# Patient Record
Sex: FEMALE | Race: WHITE | Employment: FULL TIME | URBAN - METROPOLITAN AREA
[De-identification: names, ages, dates, MRNs, and addresses within clinical notes are randomized per-mention and may not be internally consistent; named-entity substitution may affect disease eponyms.]

---

## 2022-02-11 ENCOUNTER — HOSPITAL ENCOUNTER (OUTPATIENT)
Dept: LAB | Age: 27
Discharge: HOME | End: 2022-02-11
Payer: COMMERCIAL

## 2022-02-11 LAB
SARS-COV-2: NOT DETECTED
SOURCE: NORMAL

## 2022-02-11 PROCEDURE — U0003 INFECTIOUS AGENT DETECTION BY NUCLEIC ACID (DNA OR RNA); SEVERE ACUTE RESPIRATORY SYNDROME CORONAVIRUS 2 (SARS-COV-2) (CORONAVIRUS DISEASE [COVID-19]), AMPLIFIED PROBE TECHNIQUE, MAKING USE OF HIGH THROUGHPUT TECHNOLOGIES AS DESCRIBED BY CMS-2020-01-R: HCPCS

## 2022-02-11 PROCEDURE — U0005 INFEC AGEN DETEC AMPLI PROBE: HCPCS

## 2022-02-18 ENCOUNTER — ANESTHESIA (OUTPATIENT)
Dept: LABOR AND DELIVERY | Age: 27
DRG: 539 | End: 2022-02-18
Payer: COMMERCIAL

## 2022-02-18 ENCOUNTER — ANESTHESIA EVENT (OUTPATIENT)
Dept: LABOR AND DELIVERY | Age: 27
DRG: 539 | End: 2022-02-18
Payer: COMMERCIAL

## 2022-02-18 ENCOUNTER — HOSPITAL ENCOUNTER (INPATIENT)
Age: 27
LOS: 2 days | Discharge: HOME | DRG: 539 | End: 2022-02-20
Attending: OBSTETRICS & GYNECOLOGY | Admitting: OBSTETRICS & GYNECOLOGY
Payer: COMMERCIAL

## 2022-02-18 VITALS — OXYGEN SATURATION: 98 % | SYSTOLIC BLOOD PRESSURE: 106 MMHG | DIASTOLIC BLOOD PRESSURE: 72 MMHG

## 2022-02-18 LAB
ABO/RH: NORMAL
AMPHETAMINES: NEGATIVE
ANTIBODY SCREEN: NEGATIVE
BACTERIA: NEGATIVE /HPF
BARBITURATE SCREEN URINE: NEGATIVE
BENZODIAZEPINE SCREEN, URINE: NEGATIVE
BILIRUBIN URINE: NEGATIVE MG/DL
BLOOD, URINE: NEGATIVE
CANNABINOID SCREEN URINE: ABNORMAL
CLARITY: CLEAR
COCAINE METABOLITE: NEGATIVE
COLOR: YELLOW
GLUCOSE, URINE: NEGATIVE MG/DL
HCT VFR BLD CALC: 29.6 % (ref 37–47)
HEMOGLOBIN: 9.6 GM/DL (ref 12.5–16)
KETONES, URINE: NEGATIVE MG/DL
LEUKOCYTE ESTERASE, URINE: NEGATIVE
MCH RBC QN AUTO: 28.9 PG (ref 27–31)
MCHC RBC AUTO-ENTMCNC: 32.4 % (ref 32–36)
MCV RBC AUTO: 89.2 FL (ref 78–100)
NITRITE URINE, QUANTITATIVE: NEGATIVE
OPIATES, URINE: NEGATIVE
OXYCODONE: NEGATIVE
PDW BLD-RTO: 14.5 % (ref 11.7–14.9)
PH, URINE: 7.5 (ref 5–8)
PHENCYCLIDINE, URINE: NEGATIVE
PLATELET # BLD: 136 K/CU MM (ref 140–440)
PMV BLD AUTO: 10.7 FL (ref 7.5–11.1)
PROTEIN UA: NEGATIVE MG/DL
RBC # BLD: 3.32 M/CU MM (ref 4.2–5.4)
RBC URINE: NORMAL /HPF (ref 0–6)
SPECIFIC GRAVITY UA: 1.01 (ref 1–1.03)
SQUAMOUS EPITHELIAL: 8 /HPF
UROBILINOGEN, URINE: 0.2 MG/DL (ref 0.2–1)
WBC # BLD: 7.3 K/CU MM (ref 4–10.5)
WBC UA: NORMAL /HPF (ref 0–5)

## 2022-02-18 PROCEDURE — 86901 BLOOD TYPING SEROLOGIC RH(D): CPT

## 2022-02-18 PROCEDURE — 3700000001 HC ADD 15 MINUTES (ANESTHESIA): Performed by: OBSTETRICS & GYNECOLOGY

## 2022-02-18 PROCEDURE — 6360000002 HC RX W HCPCS: Performed by: OBSTETRICS & GYNECOLOGY

## 2022-02-18 PROCEDURE — 6370000000 HC RX 637 (ALT 250 FOR IP): Performed by: OBSTETRICS & GYNECOLOGY

## 2022-02-18 PROCEDURE — 3609079900 HC CESAREAN SECTION: Performed by: OBSTETRICS & GYNECOLOGY

## 2022-02-18 PROCEDURE — 6360000002 HC RX W HCPCS: Performed by: NURSE ANESTHETIST, CERTIFIED REGISTERED

## 2022-02-18 PROCEDURE — 0UL70CZ OCCLUSION OF BILATERAL FALLOPIAN TUBES WITH EXTRALUMINAL DEVICE, OPEN APPROACH: ICD-10-PCS | Performed by: OBSTETRICS & GYNECOLOGY

## 2022-02-18 PROCEDURE — 81001 URINALYSIS AUTO W/SCOPE: CPT

## 2022-02-18 PROCEDURE — 2500000003 HC RX 250 WO HCPCS: Performed by: NURSE ANESTHETIST, CERTIFIED REGISTERED

## 2022-02-18 PROCEDURE — 7100000000 HC PACU RECOVERY - FIRST 15 MIN: Performed by: OBSTETRICS & GYNECOLOGY

## 2022-02-18 PROCEDURE — 7100000001 HC PACU RECOVERY - ADDTL 15 MIN: Performed by: OBSTETRICS & GYNECOLOGY

## 2022-02-18 PROCEDURE — 2500000003 HC RX 250 WO HCPCS: Performed by: OBSTETRICS & GYNECOLOGY

## 2022-02-18 PROCEDURE — 2580000003 HC RX 258: Performed by: OBSTETRICS & GYNECOLOGY

## 2022-02-18 PROCEDURE — 80307 DRUG TEST PRSMV CHEM ANLYZR: CPT

## 2022-02-18 PROCEDURE — 88302 TISSUE EXAM BY PATHOLOGIST: CPT

## 2022-02-18 PROCEDURE — 86850 RBC ANTIBODY SCREEN: CPT

## 2022-02-18 PROCEDURE — 1220000000 HC SEMI PRIVATE OB R&B

## 2022-02-18 PROCEDURE — 3700000000 HC ANESTHESIA ATTENDED CARE: Performed by: OBSTETRICS & GYNECOLOGY

## 2022-02-18 PROCEDURE — 85027 COMPLETE CBC AUTOMATED: CPT

## 2022-02-18 PROCEDURE — 86900 BLOOD TYPING SEROLOGIC ABO: CPT

## 2022-02-18 RX ORDER — NALBUPHINE HCL 10 MG/ML
5 AMPUL (ML) INJECTION EVERY 4 HOURS PRN
Status: CANCELLED | OUTPATIENT
Start: 2022-02-18

## 2022-02-18 RX ORDER — DOCUSATE SODIUM 100 MG/1
100 CAPSULE, LIQUID FILLED ORAL 2 TIMES DAILY
Status: DISCONTINUED | OUTPATIENT
Start: 2022-02-18 | End: 2022-02-20 | Stop reason: HOSPADM

## 2022-02-18 RX ORDER — TRISODIUM CITRATE DIHYDRATE AND CITRIC ACID MONOHYDRATE 500; 334 MG/5ML; MG/5ML
30 SOLUTION ORAL ONCE
Status: COMPLETED | OUTPATIENT
Start: 2022-02-18 | End: 2022-02-18

## 2022-02-18 RX ORDER — SIMETHICONE 80 MG
80 TABLET,CHEWABLE ORAL EVERY 6 HOURS PRN
Status: DISCONTINUED | OUTPATIENT
Start: 2022-02-18 | End: 2022-02-20 | Stop reason: HOSPADM

## 2022-02-18 RX ORDER — ONDANSETRON 2 MG/ML
4 INJECTION INTRAMUSCULAR; INTRAVENOUS EVERY 6 HOURS PRN
Status: DISCONTINUED | OUTPATIENT
Start: 2022-02-18 | End: 2022-02-20 | Stop reason: HOSPADM

## 2022-02-18 RX ORDER — NALBUPHINE HCL 10 MG/ML
10 AMPUL (ML) INJECTION EVERY 4 HOURS PRN
Status: CANCELLED | OUTPATIENT
Start: 2022-02-18

## 2022-02-18 RX ORDER — SODIUM CHLORIDE 0.9 % (FLUSH) 0.9 %
10 SYRINGE (ML) INJECTION EVERY 12 HOURS SCHEDULED
Status: DISCONTINUED | OUTPATIENT
Start: 2022-02-18 | End: 2022-02-20 | Stop reason: HOSPADM

## 2022-02-18 RX ORDER — LIDOCAINE HYDROCHLORIDE 10 MG/ML
INJECTION, SOLUTION EPIDURAL; INFILTRATION; INTRACAUDAL; PERINEURAL PRN
Status: DISCONTINUED | OUTPATIENT
Start: 2022-02-18 | End: 2022-02-18 | Stop reason: SDUPTHER

## 2022-02-18 RX ORDER — ACETAMINOPHEN 325 MG/1
650 TABLET ORAL EVERY 4 HOURS PRN
Status: DISCONTINUED | OUTPATIENT
Start: 2022-02-18 | End: 2022-02-20 | Stop reason: HOSPADM

## 2022-02-18 RX ORDER — KETOROLAC TROMETHAMINE 30 MG/ML
30 INJECTION, SOLUTION INTRAMUSCULAR; INTRAVENOUS EVERY 6 HOURS PRN
Status: CANCELLED | OUTPATIENT
Start: 2022-02-18 | End: 2022-02-23

## 2022-02-18 RX ORDER — DIPHENHYDRAMINE HYDROCHLORIDE 50 MG/ML
25 INJECTION INTRAMUSCULAR; INTRAVENOUS EVERY 4 HOURS PRN
Status: DISCONTINUED | OUTPATIENT
Start: 2022-02-18 | End: 2022-02-20 | Stop reason: HOSPADM

## 2022-02-18 RX ORDER — LANOLIN 100 %
OINTMENT (GRAM) TOPICAL
Status: DISCONTINUED | OUTPATIENT
Start: 2022-02-18 | End: 2022-02-20 | Stop reason: HOSPADM

## 2022-02-18 RX ORDER — DIPHENHYDRAMINE HYDROCHLORIDE 50 MG/ML
25 INJECTION INTRAMUSCULAR; INTRAVENOUS EVERY 4 HOURS PRN
Status: CANCELLED | OUTPATIENT
Start: 2022-02-18

## 2022-02-18 RX ORDER — BUPIVACAINE HYDROCHLORIDE 7.5 MG/ML
INJECTION, SOLUTION INTRASPINAL PRN
Status: DISCONTINUED | OUTPATIENT
Start: 2022-02-18 | End: 2022-02-18 | Stop reason: SDUPTHER

## 2022-02-18 RX ORDER — DIPHENHYDRAMINE HYDROCHLORIDE 50 MG/ML
50 INJECTION INTRAMUSCULAR; INTRAVENOUS EVERY 4 HOURS PRN
Status: CANCELLED | OUTPATIENT
Start: 2022-02-18

## 2022-02-18 RX ORDER — OXYCODONE HYDROCHLORIDE AND ACETAMINOPHEN 5; 325 MG/1; MG/1
2 TABLET ORAL EVERY 4 HOURS PRN
Status: DISCONTINUED | OUTPATIENT
Start: 2022-02-18 | End: 2022-02-20 | Stop reason: HOSPADM

## 2022-02-18 RX ORDER — FERROUS SULFATE 325(65) MG
325 TABLET ORAL 2 TIMES DAILY WITH MEALS
Status: DISCONTINUED | OUTPATIENT
Start: 2022-02-18 | End: 2022-02-20 | Stop reason: HOSPADM

## 2022-02-18 RX ORDER — NALOXONE HYDROCHLORIDE 0.4 MG/ML
0.2 INJECTION, SOLUTION INTRAMUSCULAR; INTRAVENOUS; SUBCUTANEOUS PRN
Status: DISCONTINUED | OUTPATIENT
Start: 2022-02-18 | End: 2022-02-20 | Stop reason: HOSPADM

## 2022-02-18 RX ORDER — KETOROLAC TROMETHAMINE 30 MG/ML
INJECTION, SOLUTION INTRAMUSCULAR; INTRAVENOUS PRN
Status: DISCONTINUED | OUTPATIENT
Start: 2022-02-18 | End: 2022-02-18 | Stop reason: SDUPTHER

## 2022-02-18 RX ORDER — IBUPROFEN 800 MG/1
800 TABLET ORAL EVERY 8 HOURS SCHEDULED
Status: DISCONTINUED | OUTPATIENT
Start: 2022-02-18 | End: 2022-02-20 | Stop reason: HOSPADM

## 2022-02-18 RX ORDER — METHYLERGONOVINE MALEATE 0.2 MG/ML
200 INJECTION INTRAVENOUS PRN
Status: DISCONTINUED | OUTPATIENT
Start: 2022-02-18 | End: 2022-02-20 | Stop reason: HOSPADM

## 2022-02-18 RX ORDER — ONDANSETRON 4 MG/1
8 TABLET, ORALLY DISINTEGRATING ORAL EVERY 8 HOURS PRN
Status: DISCONTINUED | OUTPATIENT
Start: 2022-02-18 | End: 2022-02-20 | Stop reason: HOSPADM

## 2022-02-18 RX ORDER — KETOROLAC TROMETHAMINE 30 MG/ML
30 INJECTION, SOLUTION INTRAMUSCULAR; INTRAVENOUS EVERY 6 HOURS PRN
Status: DISCONTINUED | OUTPATIENT
Start: 2022-02-18 | End: 2022-02-20 | Stop reason: HOSPADM

## 2022-02-18 RX ORDER — NALBUPHINE HCL 10 MG/ML
5 AMPUL (ML) INJECTION EVERY 4 HOURS PRN
Status: DISCONTINUED | OUTPATIENT
Start: 2022-02-18 | End: 2022-02-20 | Stop reason: HOSPADM

## 2022-02-18 RX ORDER — BISACODYL 10 MG
10 SUPPOSITORY, RECTAL RECTAL DAILY PRN
Status: DISCONTINUED | OUTPATIENT
Start: 2022-02-18 | End: 2022-02-20 | Stop reason: HOSPADM

## 2022-02-18 RX ORDER — OXYCODONE HYDROCHLORIDE AND ACETAMINOPHEN 5; 325 MG/1; MG/1
1 TABLET ORAL EVERY 4 HOURS PRN
Status: DISCONTINUED | OUTPATIENT
Start: 2022-02-18 | End: 2022-02-20 | Stop reason: HOSPADM

## 2022-02-18 RX ORDER — NALOXONE HYDROCHLORIDE 0.4 MG/ML
0.4 INJECTION, SOLUTION INTRAMUSCULAR; INTRAVENOUS; SUBCUTANEOUS PRN
Status: DISCONTINUED | OUTPATIENT
Start: 2022-02-18 | End: 2022-02-20 | Stop reason: HOSPADM

## 2022-02-18 RX ORDER — SODIUM CHLORIDE, SODIUM LACTATE, POTASSIUM CHLORIDE, CALCIUM CHLORIDE 600; 310; 30; 20 MG/100ML; MG/100ML; MG/100ML; MG/100ML
INJECTION, SOLUTION INTRAVENOUS CONTINUOUS
Status: DISCONTINUED | OUTPATIENT
Start: 2022-02-18 | End: 2022-02-20 | Stop reason: HOSPADM

## 2022-02-18 RX ORDER — SODIUM CHLORIDE 9 MG/ML
25 INJECTION, SOLUTION INTRAVENOUS PRN
Status: DISCONTINUED | OUTPATIENT
Start: 2022-02-18 | End: 2022-02-20 | Stop reason: HOSPADM

## 2022-02-18 RX ORDER — ONDANSETRON 2 MG/ML
4 INJECTION INTRAMUSCULAR; INTRAVENOUS EVERY 6 HOURS PRN
Status: CANCELLED | OUTPATIENT
Start: 2022-02-18

## 2022-02-18 RX ORDER — ONDANSETRON 2 MG/ML
4 INJECTION INTRAMUSCULAR; INTRAVENOUS EVERY 6 HOURS PRN
Status: DISCONTINUED | OUTPATIENT
Start: 2022-02-18 | End: 2022-02-18 | Stop reason: HOSPADM

## 2022-02-18 RX ORDER — NALOXONE HYDROCHLORIDE 0.4 MG/ML
0.2 INJECTION, SOLUTION INTRAMUSCULAR; INTRAVENOUS; SUBCUTANEOUS PRN
Status: CANCELLED | OUTPATIENT
Start: 2022-02-18

## 2022-02-18 RX ORDER — SODIUM CHLORIDE, SODIUM LACTATE, POTASSIUM CHLORIDE, AND CALCIUM CHLORIDE .6; .31; .03; .02 G/100ML; G/100ML; G/100ML; G/100ML
1000 INJECTION, SOLUTION INTRAVENOUS ONCE
Status: COMPLETED | OUTPATIENT
Start: 2022-02-18 | End: 2022-02-18

## 2022-02-18 RX ORDER — DIPHENHYDRAMINE HYDROCHLORIDE 50 MG/ML
25 INJECTION INTRAMUSCULAR; INTRAVENOUS EVERY 6 HOURS PRN
Status: DISCONTINUED | OUTPATIENT
Start: 2022-02-18 | End: 2022-02-20 | Stop reason: HOSPADM

## 2022-02-18 RX ORDER — CLINDAMYCIN PHOSPHATE 900 MG/50ML
900 INJECTION INTRAVENOUS ONCE
Status: COMPLETED | OUTPATIENT
Start: 2022-02-18 | End: 2022-02-18

## 2022-02-18 RX ORDER — DIPHENHYDRAMINE HYDROCHLORIDE 50 MG/ML
50 INJECTION INTRAMUSCULAR; INTRAVENOUS EVERY 4 HOURS PRN
Status: DISCONTINUED | OUTPATIENT
Start: 2022-02-18 | End: 2022-02-20 | Stop reason: HOSPADM

## 2022-02-18 RX ORDER — SODIUM CHLORIDE 0.9 % (FLUSH) 0.9 %
10 SYRINGE (ML) INJECTION PRN
Status: DISCONTINUED | OUTPATIENT
Start: 2022-02-18 | End: 2022-02-20 | Stop reason: HOSPADM

## 2022-02-18 RX ORDER — NALOXONE HYDROCHLORIDE 0.4 MG/ML
0.4 INJECTION, SOLUTION INTRAMUSCULAR; INTRAVENOUS; SUBCUTANEOUS PRN
Status: CANCELLED | OUTPATIENT
Start: 2022-02-18

## 2022-02-18 RX ORDER — NALBUPHINE HCL 10 MG/ML
10 AMPUL (ML) INJECTION EVERY 4 HOURS PRN
Status: DISCONTINUED | OUTPATIENT
Start: 2022-02-18 | End: 2022-02-20 | Stop reason: HOSPADM

## 2022-02-18 RX ADMIN — ENOXAPARIN SODIUM 40 MG: 100 INJECTION SUBCUTANEOUS at 20:45

## 2022-02-18 RX ADMIN — GENTAMICIN SULFATE 351.2 MG: 40 INJECTION, SOLUTION INTRAMUSCULAR; INTRAVENOUS at 08:32

## 2022-02-18 RX ADMIN — ONDANSETRON 4 MG: 2 INJECTION INTRAMUSCULAR; INTRAVENOUS at 07:54

## 2022-02-18 RX ADMIN — KETOROLAC TROMETHAMINE 30 MG: 30 INJECTION, SOLUTION INTRAMUSCULAR at 08:59

## 2022-02-18 RX ADMIN — MORPHINE SULFATE 0.1 MG: 0.5 INJECTION, SOLUTION EPIDURAL; INTRATHECAL; INTRAVENOUS at 08:24

## 2022-02-18 RX ADMIN — BUPIVACAINE HYDROCHLORIDE IN DEXTROSE 1.6 ML: 7.5 INJECTION, SOLUTION SUBARACHNOID at 08:24

## 2022-02-18 RX ADMIN — SODIUM CHLORIDE, POTASSIUM CHLORIDE, SODIUM LACTATE AND CALCIUM CHLORIDE 1000 ML: 600; 310; 30; 20 INJECTION, SOLUTION INTRAVENOUS at 06:53

## 2022-02-18 RX ADMIN — LIDOCAINE HYDROCHLORIDE 3 ML: 10 INJECTION, SOLUTION EPIDURAL; INFILTRATION; INTRACAUDAL; PERINEURAL at 08:21

## 2022-02-18 RX ADMIN — SODIUM CHLORIDE, POTASSIUM CHLORIDE, SODIUM LACTATE AND CALCIUM CHLORIDE: 600; 310; 30; 20 INJECTION, SOLUTION INTRAVENOUS at 13:41

## 2022-02-18 RX ADMIN — CLINDAMYCIN PHOSPHATE 900 MG: 900 INJECTION, SOLUTION INTRAVENOUS at 07:57

## 2022-02-18 RX ADMIN — DOCUSATE SODIUM 100 MG: 100 CAPSULE, LIQUID FILLED ORAL at 20:45

## 2022-02-18 RX ADMIN — Medication 87.3 MILLI-UNITS/MIN: at 09:30

## 2022-02-18 RX ADMIN — KETOROLAC TROMETHAMINE 30 MG: 30 INJECTION, SOLUTION INTRAMUSCULAR; INTRAVENOUS at 15:03

## 2022-02-18 RX ADMIN — SODIUM CITRATE AND CITRIC ACID MONOHYDRATE 30 ML: 500; 334 SOLUTION ORAL at 07:54

## 2022-02-18 RX ADMIN — Medication 909 ML/HR: at 08:45

## 2022-02-18 RX ADMIN — SODIUM CHLORIDE, POTASSIUM CHLORIDE, SODIUM LACTATE AND CALCIUM CHLORIDE: 600; 310; 30; 20 INJECTION, SOLUTION INTRAVENOUS at 20:50

## 2022-02-18 RX ADMIN — SODIUM CHLORIDE, POTASSIUM CHLORIDE, SODIUM LACTATE AND CALCIUM CHLORIDE: 600; 310; 30; 20 INJECTION, SOLUTION INTRAVENOUS at 07:49

## 2022-02-18 ASSESSMENT — PULMONARY FUNCTION TESTS
PIF_VALUE: -15
PIF_VALUE: -16
PIF_VALUE: -16
PIF_VALUE: 0
PIF_VALUE: -13
PIF_VALUE: -16
PIF_VALUE: -14
PIF_VALUE: -14
PIF_VALUE: -16
PIF_VALUE: -14
PIF_VALUE: -16
PIF_VALUE: 0
PIF_VALUE: -16
PIF_VALUE: -15
PIF_VALUE: -16
PIF_VALUE: 0
PIF_VALUE: -16
PIF_VALUE: 0
PIF_VALUE: 0
PIF_VALUE: -16
PIF_VALUE: 0
PIF_VALUE: -16
PIF_VALUE: -14
PIF_VALUE: 0
PIF_VALUE: -15
PIF_VALUE: -16
PIF_VALUE: -15
PIF_VALUE: 0
PIF_VALUE: -16
PIF_VALUE: -15
PIF_VALUE: -14
PIF_VALUE: 0
PIF_VALUE: 0
PIF_VALUE: -16
PIF_VALUE: -1
PIF_VALUE: 0
PIF_VALUE: 0
PIF_VALUE: -14
PIF_VALUE: -16
PIF_VALUE: -16

## 2022-02-18 ASSESSMENT — PAIN SCALES - GENERAL: PAINLEVEL_OUTOF10: 3

## 2022-02-18 NOTE — FLOWSHEET NOTE
Pt stood at bedside and moves well. Denies feeling lightheaded or dizzy. Marilynn care and erwin care done by this nurse. Then pt resting back in bed.

## 2022-02-18 NOTE — PLAN OF CARE
Problem: Discharge Planning:  Goal: Discharged to appropriate level of care  Description: Discharged to appropriate level of care  2/18/2022 1326 by Lizbeth Campos RN  Outcome: Ongoing     Problem: Fluid Volume - Imbalance:  Goal: Absence of postpartum hemorrhage signs and symptoms  Description: Absence of postpartum hemorrhage signs and symptoms  2/18/2022 1326 by Lizbeth Campos RN  Outcome: Ongoing  Goal: Absence of imbalanced fluid volume signs and symptoms  Description: Absence of imbalanced fluid volume signs and symptoms  2/18/2022 1326 by Lizbeth Campos RN  Outcome: Ongoing     Problem: Infection - Surgical Site:  Goal: Will show no infection signs and symptoms  Description: Will show no infection signs and symptoms  2/18/2022 1326 by Lizbeth Campos RN  Outcome: Ongoing     Problem: Mood - Altered:  Goal: Mood stable  Description: Mood stable  2/18/2022 1326 by Lizbeth Campos RN  Outcome: Ongoing     Problem: Nausea/Vomiting:  Goal: Absence of nausea/vomiting  Description: Absence of nausea/vomiting  2/18/2022 1326 by Lizbeth Campos RN  Outcome: Ongoing     Problem: Pain - Acute:  Goal: Pain level will decrease  Description: Pain level will decrease  2/18/2022 1326 by Lizbeth Campos RN  Outcome: Ongoing     Problem: Urinary Retention:  Goal: Urinary elimination within specified parameters  Description: Urinary elimination within specified parameters  2/18/2022 1326 by Lizbeth Campos RN  Outcome: Ongoing     Problem: Venous Thromboembolism:  Goal: Absence of signs or symptoms of impaired coagulation  Description: Absence of signs or symptoms of impaired coagulation  2/18/2022 1326 by Lizbeth Campos RN  Outcome: Ongoing     Problem: Nutritional:  Goal: Knowledge of adequate nutritional intake and output  Description: Knowledge of adequate nutritional intake and output  2/18/2022 1326 by Lizbeth Campos RN  Outcome: Ongoing  Goal: Knowledge of infant formula  Description: Knowledge of infant formula  2/18/2022 1326 by Morgan Simeon. Rosa Marquez, SULY  Outcome: Ongoing  Goal: Knowledge of infant feeding cues  Description: Knowledge of infant feeding cues  2/18/2022 1326 by Morgan Simeon.  Rosa Marquez, SULY  Outcome: Ongoing

## 2022-02-18 NOTE — FLOWSHEET NOTE
Pt had sterilization done with a Filshie Clip. MRI Implant Notification ID Card given to pt. Explained card to pt that she needs to keep the card with her important papers and that she needs to show it to anyone who performs a MRI on her. Pt denies any questions. Pt verbalizes understanding.

## 2022-02-18 NOTE — FLOWSHEET NOTE
Patient presented this morning for scheduled R C/S and BPS. She arrived ambulatory with FOB. She denies bleeding or leaking of fluid, pain or tenderness,  fetal movement is obvious. Plan of care has been reviewed with understanding and agreement verbalized.

## 2022-02-18 NOTE — ANESTHESIA POSTPROCEDURE EVALUATION
Department of Anesthesiology  Postprocedure Note    Patient: Emmalene Leyden  MRN: [id number]  YOB: 1995  Date of evaluation: 2022  Time:  12:12 PM     Procedure Summary     Date: 22 Room / Location: [address] [place] [address]    Anesthesia Start: 4100 Anesthesia Stop: 3093    Procedures:        SECTION (N/A Abdomen)      TUBAL LIGATION (Bilateral ) Diagnosis: ( Repeat  section with BPS)    Surgeons: Eduard Pollack MD Responsible Provider:     Anesthesia Type: spinal ASA Status: 2          Anesthesia Type: spinal    Jelena Phase I: Jelena Score: 9    Jelena Phase II:      Last vitals: Reviewed and per EMR flowsheets.        Anesthesia Post Evaluation    Patient location during evaluation: floor  Patient participation: complete - patient participated  Level of consciousness: awake and alert  Pain score: 1  Airway patency: patent  Nausea & Vomiting: no nausea  Complications: no  Cardiovascular status: hemodynamically stable  Respiratory status: acceptable  Hydration status: euvolemic

## 2022-02-18 NOTE — PLAN OF CARE
Problem: Anxiety:  Goal: Level of anxiety will decrease  Description: Level of anxiety will decrease  2/18/2022 0649 by Yanelis Meneses RN  Outcome: Ongoing     Problem: Aspiration - Risk of:  Goal: Absence of aspiration  Description: Absence of aspiration  2/18/2022 7525 by Yanelis Meneses RN  Outcome: Ongoing     Problem: Tissue Perfusion - Uteroplacental, Altered:  Goal: Absence of abnormal fetal heart rate pattern  Description: Absence of abnormal fetal heart rate pattern  2/18/2022 0649 by Yanelis Meneses RN  Outcome: Ongoing     Problem: Venous Thromboembolism - Risk of:  Goal: Will show no signs or symptoms of venous thromboembolism  Description: Will show no signs or symptoms of venous thromboembolism  2/18/2022 0649 by Yanelis Meneses RN  Outcome: Ongoing     Problem: Discharge Planning:  Goal: Discharged to appropriate level of care  Description: Discharged to appropriate level of care  Outcome: Ongoing     Problem: Fluid Volume - Imbalance:  Goal: Absence of postpartum hemorrhage signs and symptoms  Description: Absence of postpartum hemorrhage signs and symptoms  Outcome: Ongoing  Goal: Absence of imbalanced fluid volume signs and symptoms  Description: Absence of imbalanced fluid volume signs and symptoms  Outcome: Ongoing     Problem: Infection - Surgical Site:  Goal: Will show no infection signs and symptoms  Description: Will show no infection signs and symptoms  Outcome: Ongoing     Problem: Mood - Altered:  Goal: Mood stable  Description: Mood stable  Outcome: Ongoing     Problem: Nausea/Vomiting:  Goal: Absence of nausea/vomiting  Description: Absence of nausea/vomiting  Outcome: Ongoing     Problem: Pain - Acute:  Goal: Pain level will decrease  Description: Pain level will decrease  Outcome: Ongoing     Problem: Urinary Retention:  Goal: Urinary elimination within specified parameters  Description: Urinary elimination within specified parameters  Outcome: Ongoing     Problem: Venous Thromboembolism:  Goal: Will show no signs or symptoms of venous thromboembolism  Description: Will show no signs or symptoms of venous thromboembolism  2/18/2022 0649 by Chelsie Spaulding RN  Outcome: Ongoing  Goal: Absence of signs or symptoms of impaired coagulation  Description: Absence of signs or symptoms of impaired coagulation  Outcome: Ongoing     Problem: Nutritional:  Goal: Knowledge of adequate nutritional intake and output  Description: Knowledge of adequate nutritional intake and output  Outcome: Ongoing  Goal: Knowledge of infant formula  Description: Knowledge of infant formula  Outcome: Ongoing  Goal: Knowledge of infant feeding cues  Description: Knowledge of infant feeding cues  Outcome: Ongoing

## 2022-02-18 NOTE — L&D DELIVERY NOTE
Mother's Information    Labor Events     labor?: No     Mother Delivery Information    Episiotomy: None  Lacerations: None  Surgical or Additional Est. Blood Loss (mL): 0 (View Only): Edit in Flowsheets   Combined Est. Blood Loss (mL): 0        Tomasz Butler, Baby Pending Nickolas Guzman [[id number]]    Labor Events     labor?: No   steroids?: None  Cervical ripening date/time:     Rupture date/time:             Assisted Delivery Details    Forceps attempted?: No  Vacuum extractor attempted?: No     Document Additional Attempt       Document Additional Attempt              Presentation    Presentation: Vertex  _: Occiput  _: Anterior     [place] Information    Head delivery date/time: 2022 08:44:00   Changing the 's delivery date/time could affect patient care.:    Delivery date/time:  22   Delivery type: , Low Transverse    Details:  Skin Incision Type: Pfannenstiel   Uterine Incision: Low Transverse         Delivery Providers    Delivering clinician:      Cord    Vessels: 3 Vessels     Placenta    Removal: Manual Removal  Appearance: Intact     Delivery Resuscitation    Method: Bulb Suction, Stimulation     Apgars    Living status: Living  Apgars   1 Minute:  5 Minute:  10 Minute 15 Minute 20 Minute   Skin Color: 1  1       Heart Rate: 2  2       Reflex Irritability: 2  2       Muscle Tone: 2  2       Respiratory Effort: 2  2       Total: 9  9                  [place] Measurements       Delivery Information    Episiotomy: None  Perineal lacerations: None    Surgical or additional est. blood loss (mL): 0 (View Only): Edit in Flowsheets   Combined est. blood loss (mL): 0     Other Procedures    Procedures: Bilateral Tubal Ligation, Filshie Clips, Other        PATIENT:    Cailin Fuchs    PREOPERATIVE DIAGNOSES:  1.  G3 39w2d        2. Prior uterine surgery, desires repeat       3.  Desires permanent sterilization     POSTOPERATIVE DIAGNOSES:  Same      PROCEDURE: Repeat low transverse  section. Bilateral tubal ligation with filschie clips and distal fimbriectomy      SURGEON:     Hernan Soria M.D.              ESTIMATED BLOOD LOSS:   500    FLUID REPLACEMENT  500    URINE OUTPUT   546      COMPLICATIONS:     None. ANESTHESIA:    Spinal.         FINDINGS:   Live male  Named Mark       Infant Apgars 9 and 9 at 1 and 5 min respectively. Weight: 7 lbs 11 oz. Normal tubes and ovaries bilaterally. DETAILS OF PROCEDURE:   The patient was taken to the operative room and anesthesia was given without any complications. She was then placed in the supine position slightly tilted to the left. Abdomen was prepped and draped in the usual manner. Anesthesia level was checked and was found to be adequate. The abdomen was entered and carried down sharply to the fascia. The fascia was entered and  from the underlying rectus abdominis muscles which were  in the midline exposing the parietal peritoneum. The peritoneum was opened sharply in a longitudinal fashion. A bladder retractor was placed in position and the visceral peritoneum overlying the lower uterine segment was opened transversely and a bladder flap was developed in a sharp manner. The lower uterine segment was opened in a low transverse fashion. The amniotic cavity was entered and  amniotic fluid was suctioned out. The baby was then delivered from the Cephalic position. Cord was clamped and cut and the baby was handed over to the nursery nurse. Cord blood was saved. The placenta was then removed manually and the endometrial cavity was swept clean by a wet lap. The edges of the uterine incision were grasped by Allis clamps and the incision itself was closed using a continuous locked suture of 0 vicryl. A second layer assured hemostasis.      Tubes and ovaries were inspected and appeared to be normal.  We once again confirmed with the patient the desire for permanent sterilization. A single filschie clip was placed across the distal portion of each tube; August Singhr Then a bilateral fimbriectomy was performed just past the filschies. Each fimbria was handed off to pathology    The gutters were cleared of any blood clots and debris. The operative field appeared to be hemostatic. Peritoneum was then closed with running stitch of 3.0 vicryl, fascia closed with 0 vicryl, subcutaneous tissues re-approximated with interrupted 3.0 vicryl, and skin closed with 3.0 monocryl, steri strips, and a dressing. The patient was then transferred to the recovery room in good stable condition. All sponge needle and instrument counts found to be correct. The patient was given antibiotics prior to the procedure for wound prophylaxis.     Electronically signed by Jose Alfredo Servin MD on 2/18/2022 at 9:16 AM

## 2022-02-18 NOTE — ANESTHESIA PROCEDURE NOTES
Spinal Block    Patient location during procedure: OB  Start time: 2/18/2022 8:19 AM  End time: 2/18/2022 8:25 AM  Reason for block: post-op pain management and primary anesthetic  Staffing  Performed: resident/CRNA   Resident/CRNA: LATIA Le CRNA  Preanesthetic Checklist  Completed: patient identified, IV checked, site marked, risks and benefits discussed, surgical consent, monitors and equipment checked, pre-op evaluation, timeout performed, anesthesia consent given, oxygen available and patient being monitored  Spinal Block  Patient position: sitting  Prep: ChloraPrep  Patient monitoring: cardiac monitor, continuous pulse ox and frequent blood pressure checks  Approach: midline  Location: L3/L4  Provider prep: mask and sterile gloves  Local infiltration: lidocaine  Dose: 0.2  Agent: bupivacaine  Adjuvant: duramorph  Dose: 1.6  Dose: 1.6  Needle  Needle type: Pencan   Needle gauge: 25 G  Needle length: 3.5 in  Needle insertion depth: 6 cm  Assessment  Sensory level: T4  Swirl obtained: Yes  CSF: clear  Attempts: 1  Hemodynamics: stable

## 2022-02-18 NOTE — FLOWSHEET NOTE
Pt from L&D RR to MB unit via bed. Pt condition stable. Informed pt if she needs nurse to call number listed on white board, pt verbalizes understanding. Lung sounds clear bilat. Pt instructed to C&DB every hour while awake. Instructed pt on how to splint abd while coughing. Pt does so at this time. Abd soft with abd dressing dry and intact. SCD on bilat below knees. Simpson patent with clear yellow urine. IV infusing without difficulty. IV site without redness or edema. IV Dressing is dry and intact. Pt oriented to room. Pt aware of how to use phone, tv and call light. Instructed pt on how to order meals. Pt aware of smoking and visitation policies. Pt instructed not to get out of bed without assistance, pt verbalizes understanding. Father of baby with pt. Fresh water pitcher given to pt. Assessment completed.

## 2022-02-18 NOTE — H&P
Department of Obstetrics and Gynecology   Obstetrics History and Physical        CHIEF COMPLAINT:  RCS BPS    HISTORY OF PRESENT ILLNESS:      The patient is a 32 y.o. female at 44w2d. OB History        3    Para        Term                AB        Living           SAB        IAB        Ectopic        Molar        Multiple        Live Births                Patient presents with a chief complaint as above and is being admitted for RCS with BPS    Estimated Due Date: Estimated Date of Delivery: 22    PRENATAL CARE:    Complicated by: none    PAST OB HISTORY  OB History        3    Para        Term                AB        Living           SAB        IAB        Ectopic        Molar        Multiple        Live Births                    Past Medical History:        Diagnosis Date    Anemia      Past Surgical History:        Procedure Laterality Date    ABDOMEN SURGERY       Allergies:  Amoxicillin, Ceclor [cefaclor], and Pcn [penicillins]  Social History:    Social History     Socioeconomic History    Marital status: Single     Spouse name: Not on file    Number of children: Not on file    Years of education: Not on file    Highest education level: Not on file   Occupational History    Not on file   Tobacco Use    Smoking status: Never Smoker    Smokeless tobacco: Never Used   Substance and Sexual Activity    Alcohol use: Not Currently    Drug use: Never    Sexual activity: Not on file   Other Topics Concern    Not on file   Social History Narrative    Not on file     Social Determinants of Health     Financial Resource Strain:     Difficulty of Paying Living Expenses: Not on file   Food Insecurity:     Worried About Running Out of Food in the Last Year: Not on file    Nj of Food in the Last Year: Not on file   Transportation Needs:     Lack of Transportation (Medical): Not on file    Lack of Transportation (Non-Medical):  Not on file   Physical Activity:     Days of Exercise per Week: Not on file    Minutes of Exercise per Session: Not on file   Stress:     Feeling of Stress : Not on file   Social Connections:     Frequency of Communication with Friends and Family: Not on file    Frequency of Social Gatherings with Friends and Family: Not on file    Attends Confucianism Services: Not on file    Active Member of [address] [place] [address] [employer] or Organizations: Not on file    Attends Club or Organization Meetings: Not on file    Marital Status: Not on file   Intimate Partner Violence:     Fear of Current or Ex-Partner: Not on file    Emotionally Abused: Not on file    Physically Abused: Not on file    Sexually Abused: Not on file   Housing Stability:     Unable to Pay for Housing in the Last Year: Not on file    Number of [place] in the Last Year: Not on file    Unstable Housing in the Last Year: Not on file     Family History:   History reviewed. No pertinent family history. Medications Prior to Admission:  Medications Prior to Admission: Prenatal Vit-Fe Fumarate-FA (PRENATAL 1+1 PO), Take by mouth    REVIEW OF SYSTEMS:    CONSTITUTIONAL:  negative  RESPIRATORY:  negative  CARDIOVASCULAR:  negative  GASTROINTESTINAL:  negative  ALLERGIC/IMMUNOLOGIC:  negative  NEUROLOGICAL:  negative  BEHAVIOR/PSYCH:  negative    PHYSICAL EXAM:  Blood pressure 108/68, pulse 93, temperature 98 °F (36.7 °C), resp. rate 19, height 5' 9\" (1.753 m), weight 168 lb (76.2 kg), SpO2 98 %. General appearance:  awake, alert, cooperative, no apparent distress, and appears stated age  Neurologic:  Awake, alert, oriented to name, place and time. Lungs:  No increased work of breathing, good air exchange  Abdomen:  Soft, non tender, gravid, consistent with her gestational age  Fetal heart rate:    Baseline Heart Rate:   Category 1 tracing     Pelvis:  Adequate pelvis  Cervix: Closee    Contraction frequency:  0 minutes    Membranes:  Intact    ASSESSMENT AND PLAN:    Labor: Admit, anticipate RCS with BPS. R/b/i/a discussed with patient.  Wishes to proceed  Fetus: Reassuring  GBS: No  Other:

## 2022-02-18 NOTE — ANESTHESIA PRE PROCEDURE
Procedure Laterality Date    ABDOMEN SURGERY         Social History:    Social History     Tobacco Use    Smoking status: Never Smoker    Smokeless tobacco: Never Used   Substance Use Topics    Alcohol use: Not Currently                                Counseling given: Not Answered      Vital Signs (Current):   Vitals:    02/18/22 0629 02/18/22 0641 02/18/22 0645   BP: 108/68     Pulse: 93 93    Resp: 18 19    Temp: 36.7 °C (98 °F) 36.7 °C (98 °F)    TempSrc: Oral     SpO2: 98%     Weight:   168 lb (76.2 kg)   Height:   5' 9\" (1.753 m)                                              BP Readings from Last 3 Encounters:   02/18/22 108/68       NPO Status:                                                                                 BMI:   Wt Readings from Last 3 Encounters:   02/18/22 168 lb (76.2 kg)     Body mass index is 24.81 kg/m². CBC:   Lab Results   Component Value Date    WBC 7.3 02/18/2022    RBC 3.32 02/18/2022    HGB 9.6 02/18/2022    HCT 29.6 02/18/2022    MCV 89.2 02/18/2022    RDW 14.5 02/18/2022     02/18/2022       CMP: No results found for: NA, K, CL, CO2, BUN, CREATININE, GFRAA, AGRATIO, LABGLOM, GLUCOSE, GLU, PROT, CALCIUM, BILITOT, ALKPHOS, AST, ALT    POC Tests: No results for input(s): POCGLU, POCNA, POCK, POCCL, POCBUN, POCHEMO, POCHCT in the last 72 hours.     Coags: No results found for: PROTIME, INR, APTT    HCG (If Applicable): No results found for: PREGTESTUR, PREGSERUM, HCG, HCGQUANT     ABGs: No results found for: PHART, PO2ART, UNX7JBV, QWM3DKD, BEART, U8TKPTSV     Type & Screen (If Applicable):  No results found for: LABABO, LABRH    Drug/Infectious Status (If Applicable):  No results found for: HIV, HEPCAB    COVID-19 Screening (If Applicable):   Lab Results   Component Value Date    COVID19 NOT DETECTED 02/11/2022           Anesthesia Evaluation  Patient summary reviewed and Nursing notes reviewed no history of anesthetic complications:   Airway: Mallampati: II  TM distance: >3 FB   Neck ROM: full  Mouth opening: > = 3 FB Dental:    (+) poor dentition      Pulmonary:Negative Pulmonary ROS                              Cardiovascular:Negative CV ROS  Exercise tolerance: good (>4 METS),                     Neuro/Psych:   Negative Neuro/Psych ROS              GI/Hepatic/Renal: Neg GI/Hepatic/Renal ROS            Endo/Other: Negative Endo/Other ROS                    Abdominal:             Vascular: negative vascular ROS. Other Findings:             Anesthesia Plan      spinal     ASA 2             Anesthetic plan and risks discussed with patient and spouse.                       Vannessa Bella, APRN - CRNA   2/18/2022

## 2022-02-19 LAB
BASOPHILS ABSOLUTE: 0 K/CU MM
BASOPHILS RELATIVE PERCENT: 0.2 % (ref 0–1)
DIFFERENTIAL TYPE: ABNORMAL
EOSINOPHILS ABSOLUTE: 0.1 K/CU MM
EOSINOPHILS RELATIVE PERCENT: 1.3 % (ref 0–3)
HCT VFR BLD CALC: 29.1 % (ref 37–47)
HEMOGLOBIN: 9.2 GM/DL (ref 12.5–16)
IMMATURE NEUTROPHIL %: 0.8 % (ref 0–0.43)
LYMPHOCYTES ABSOLUTE: 1.1 K/CU MM
LYMPHOCYTES RELATIVE PERCENT: 12.5 % (ref 24–44)
MCH RBC QN AUTO: 28.6 PG (ref 27–31)
MCHC RBC AUTO-ENTMCNC: 31.6 % (ref 32–36)
MCV RBC AUTO: 90.4 FL (ref 78–100)
MONOCYTES ABSOLUTE: 0.5 K/CU MM
MONOCYTES RELATIVE PERCENT: 5 % (ref 0–4)
NUCLEATED RBC %: 0 %
PDW BLD-RTO: 14.6 % (ref 11.7–14.9)
PLATELET # BLD: 117 K/CU MM (ref 140–440)
PMV BLD AUTO: 10.6 FL (ref 7.5–11.1)
RBC # BLD: 3.22 M/CU MM (ref 4.2–5.4)
SEGMENTED NEUTROPHILS ABSOLUTE COUNT: 7.2 K/CU MM
SEGMENTED NEUTROPHILS RELATIVE PERCENT: 80.2 % (ref 36–66)
TOTAL IMMATURE NEUTOROPHIL: 0.07 K/CU MM
TOTAL NUCLEATED RBC: 0 K/CU MM
WBC # BLD: 9 K/CU MM (ref 4–10.5)

## 2022-02-19 PROCEDURE — 6370000000 HC RX 637 (ALT 250 FOR IP): Performed by: OBSTETRICS & GYNECOLOGY

## 2022-02-19 PROCEDURE — 6360000002 HC RX W HCPCS: Performed by: OBSTETRICS & GYNECOLOGY

## 2022-02-19 PROCEDURE — 1220000000 HC SEMI PRIVATE OB R&B

## 2022-02-19 PROCEDURE — 85025 COMPLETE CBC W/AUTO DIFF WBC: CPT

## 2022-02-19 RX ORDER — OXYCODONE HYDROCHLORIDE AND ACETAMINOPHEN 5; 325 MG/1; MG/1
1 TABLET ORAL EVERY 6 HOURS PRN
Qty: 24 TABLET | Refills: 0 | Status: SHIPPED | OUTPATIENT
Start: 2022-02-19 | End: 2022-02-26

## 2022-02-19 RX ORDER — IBUPROFEN 800 MG/1
800 TABLET ORAL EVERY 8 HOURS PRN
Qty: 30 TABLET | Refills: 1 | Status: SHIPPED | OUTPATIENT
Start: 2022-02-19

## 2022-02-19 RX ADMIN — ENOXAPARIN SODIUM 40 MG: 100 INJECTION SUBCUTANEOUS at 14:11

## 2022-02-19 RX ADMIN — IBUPROFEN 800 MG: 800 TABLET, FILM COATED ORAL at 22:44

## 2022-02-19 RX ADMIN — OXYCODONE AND ACETAMINOPHEN 1 TABLET: 5; 325 TABLET ORAL at 05:03

## 2022-02-19 RX ADMIN — IBUPROFEN 800 MG: 800 TABLET, FILM COATED ORAL at 06:16

## 2022-02-19 RX ADMIN — DOCUSATE SODIUM 100 MG: 100 CAPSULE, LIQUID FILLED ORAL at 20:12

## 2022-02-19 RX ADMIN — IBUPROFEN 800 MG: 800 TABLET, FILM COATED ORAL at 14:11

## 2022-02-19 RX ADMIN — DOCUSATE SODIUM 100 MG: 100 CAPSULE, LIQUID FILLED ORAL at 09:06

## 2022-02-19 RX ADMIN — OXYCODONE AND ACETAMINOPHEN 1 TABLET: 5; 325 TABLET ORAL at 23:49

## 2022-02-19 RX ADMIN — OXYCODONE AND ACETAMINOPHEN 1 TABLET: 5; 325 TABLET ORAL at 09:06

## 2022-02-19 RX ADMIN — OXYCODONE AND ACETAMINOPHEN 1 TABLET: 5; 325 TABLET ORAL at 18:31

## 2022-02-19 ASSESSMENT — PAIN SCALES - GENERAL
PAINLEVEL_OUTOF10: 6
PAINLEVEL_OUTOF10: 5
PAINLEVEL_OUTOF10: 3
PAINLEVEL_OUTOF10: 4
PAINLEVEL_OUTOF10: 3
PAINLEVEL_OUTOF10: 5
PAINLEVEL_OUTOF10: 4
PAINLEVEL_OUTOF10: 2

## 2022-02-19 NOTE — PLAN OF CARE
Problem: Discharge Planning:  Goal: Discharged to appropriate level of care  Description: Discharged to appropriate level of care  Outcome: Ongoing     Problem: Fluid Volume - Imbalance:  Goal: Absence of postpartum hemorrhage signs and symptoms  Description: Absence of postpartum hemorrhage signs and symptoms  2/19/2022 1353 by Becca Armendariz. Joe Boateng RN  Outcome: Ongoing  2/19/2022 0836 by Barry Black RN  Outcome: Met This Shift  Goal: Absence of imbalanced fluid volume signs and symptoms  Description: Absence of imbalanced fluid volume signs and symptoms  2/19/2022 1353 by Becca Armendariz. Joe Boateng RN  Outcome: Ongoing  2/19/2022 0836 by Barry Black RN  Outcome: Met This Shift     Problem: Infection - Surgical Site:  Goal: Will show no infection signs and symptoms  Description: Will show no infection signs and symptoms  2/19/2022 1353 by Becca Armendariz. Joe Boateng RN  Outcome: Ongoing  2/19/2022 0836 by Barry Black RN  Outcome: Met This Shift     Problem: Nausea/Vomiting:  Goal: Absence of nausea/vomiting  Description: Absence of nausea/vomiting  2/19/2022 1353 by Becca Armendariz. Joe Boateng RN  Outcome: Ongoing  2/19/2022 0836 by Barry Black RN  Outcome: Met This Shift     Problem: Pain - Acute:  Goal: Pain level will decrease  Description: Pain level will decrease  2/19/2022 1353 by Becca Armendariz. Joe Boateng RN  Outcome: Ongoing  2/19/2022 0836 by Barry Black RN  Outcome: Met This Shift     Problem: Urinary Retention:  Goal: Urinary elimination within specified parameters  Description: Urinary elimination within specified parameters  2/19/2022 1353 by Becca Armendariz. Joe Boateng RN  Outcome: Ongoing  2/19/2022 0836 by Barry Black RN  Outcome: Met This Shift     Problem: Venous Thromboembolism:  Goal: Absence of signs or symptoms of impaired coagulation  Description: Absence of signs or symptoms of impaired coagulation  2/19/2022 1353 by Becca Armendraiz.  Joe Boateng RN  Outcome: Ongoing  2/19/2022 0836 by Barry Black RN  Outcome: Met This Shift     Problem: Pain:  Goal: Pain level will decrease  Description: Pain level will decrease  2/19/2022 1353 by Shakila Toney. Frank Santana RN  Outcome: Ongoing  2/19/2022 0836 by Lucrecia Tolliver RN  Outcome: Met This Shift  Goal: Control of acute pain  Description: Control of acute pain  2/19/2022 1353 by Shakila Toney. rFank Santana RN  Outcome: Ongoing  2/19/2022 0836 by Lucrecia Tolliver RN  Outcome: Met This Shift  Goal: Control of chronic pain  Description: Control of chronic pain  2/19/2022 1353 by Shakila Toney. Frank Santana RN  Outcome: Ongoing  2/19/2022 0836 by Lucrecia Tolliver RN  Outcome: Met This Shift     Problem: Mood - Altered:  Goal: Mood stable  Description: Mood stable  2/19/2022 1353 by Shakila Toney. Frank Santana RN  Outcome: Completed  2/19/2022 0836 by Lucrecia Tolliver RN  Outcome: Met This Shift     Problem: Nutritional:  Goal: Knowledge of adequate nutritional intake and output  Description: Knowledge of adequate nutritional intake and output  2/19/2022 1353 by Shakila Toney. Frank Santana RN  Outcome: Completed  2/19/2022 0836 by Lucrecia Tolliver RN  Outcome: Met This Shift  Goal: Knowledge of infant formula  Description: Knowledge of infant formula  2/19/2022 1353 by Shakila Toney. Frank Santana RN  Outcome: Completed  2/19/2022 0836 by Lucrecia Tolliver RN  Outcome: Met This Shift  Goal: Knowledge of infant feeding cues  Description: Knowledge of infant feeding cues  2/19/2022 1353 by Shakila Toney.  Frank Santana RN  Outcome: Completed  2/19/2022 0836 by Lucrecia Tolliver RN  Outcome: Met This Shift

## 2022-02-19 NOTE — CARE COORDINATION
LSW received consult re: mother and baby (+) mj. LSW reviewed chart and met with pt/FOB Fredis Nolen) to initiate dc planning. LSW advised pt of sensitive PHI needing to be discussed. Pt provided verbal permission to discuss PHI with FOB present. Pt delivered baby boyAllyssa 02/18/22. Pt reported prior to admission living with FOB at [place] [address], [facility], [address] [facility], [address], #[phone]. Pt and FOB reported recently moving to [facility] because of available housing. FOB reported he is from [facility], however, d/t the location now being a tourist attraction, he could not find affordable housing and moved to [facility] as rec'd by a friend. Pt reported having two other children, Carolina 10 & Geronimo 6, who reside in [place] [address], where pt is from. Pt reported her dtr resides with her sister and her son resides with his father. Pt reported having custody of both children and admits to previous CPS involvement w/o active case. Pt reported her dtr chose to stay in [place] because of the school system, however, did not offer an explanation for her son. Pt reported moving from [place] to [facility] last yr after meeting FOB. Pt reported working FT at [employer] prior to delivery with intentions to return. Pt reported FOB also works FT at [employer]. Pt has material items for baby, plans to formula feed and has a working vehicle. Pt has health insurance through [address]. Pt has not yet made contact with [facility]. LSW encouraged pt contact [facility] after holiday to schedule mtg for [address] assistance. Pt is unsure of pedi care and does not have PCP herself. LSW encouraged [facility] referral, although pt is unsure. LSW dicussed uds (+) mj. Pt admitted to smoking mj throughout pregnancy to help give her an appetite. Pt denied drug addiction is is not interested in counseling services at this time. LSW advised mj is illegal in [facility] [place] and encouraged abstinence. LSW advised a CPS referral would be made.   Pt voiced verbal understanding and denied questions. LSW gave referral to Coni Mullins on call CPS , who accepted referral and will f/u with pt prior to dc. CM staff to follow.   Electronically signed by SAMMY Muñoz on 2/19/2022 at 11:34 AM

## 2022-02-19 NOTE — PLAN OF CARE
Problem: Fluid Volume - Imbalance:  Goal: Absence of postpartum hemorrhage signs and symptoms  Description: Absence of postpartum hemorrhage signs and symptoms  Outcome: Met This Shift  Goal: Absence of imbalanced fluid volume signs and symptoms  Description: Absence of imbalanced fluid volume signs and symptoms  Outcome: Met This Shift     Problem: Infection - Surgical Site:  Goal: Will show no infection signs and symptoms  Description: Will show no infection signs and symptoms  Outcome: Met This Shift     Problem: Mood - Altered:  Goal: Mood stable  Description: Mood stable  Outcome: Met This Shift     Problem: Nausea/Vomiting:  Goal: Absence of nausea/vomiting  Description: Absence of nausea/vomiting  Outcome: Met This Shift     Problem: Pain - Acute:  Goal: Pain level will decrease  Description: Pain level will decrease  Outcome: Met This Shift     Problem: Urinary Retention:  Goal: Urinary elimination within specified parameters  Description: Urinary elimination within specified parameters  Outcome: Met This Shift     Problem: Venous Thromboembolism:  Goal: Absence of signs or symptoms of impaired coagulation  Description: Absence of signs or symptoms of impaired coagulation  Outcome: Met This Shift     Problem: Nutritional:  Goal: Knowledge of adequate nutritional intake and output  Description: Knowledge of adequate nutritional intake and output  Outcome: Met This Shift  Goal: Knowledge of infant formula  Description: Knowledge of infant formula  Outcome: Met This Shift  Goal: Knowledge of infant feeding cues  Description: Knowledge of infant feeding cues  Outcome: Met This Shift     Problem: Pain:  Goal: Pain level will decrease  Description: Pain level will decrease  Outcome: Met This Shift  Goal: Control of acute pain  Description: Control of acute pain  Outcome: Met This Shift  Goal: Control of chronic pain  Description: Control of chronic pain  Outcome: Met This Shift

## 2022-02-19 NOTE — PROGRESS NOTES
Subjective:     Postpartum Day 1:  Delivery    The patient feels well. The patient denies emotional concerns. Pain is controlled with current medications. The baby is well. Urinary output is adequate. The patient is ambulating well and tolerating a normal diet. Flatus has been passed. Objective: Intake/Output Summary (Last 24 hours) at 2022 3051  Last data filed at 2022  Gross per 24 hour   Intake 2540.14 ml   Output 1950 ml   Net 590.14 ml        Vitals:    22 0822   BP: (!) 99/59   Pulse: 60   Resp: 16   Temp: 97.4 °F (36.3 °C)   SpO2: 96%       General:    Alert and oriented, appears stated age, cooperative       Lochia:  appropriate   Uterine Fundus:   Fundus firm and nontender   Incision:  Healing well with no significant drainage; no drainage or dehiscence   DVT Evaluation:  No evidence of DVT; calves nontender     Lab Results   Component Value Date    WBC 7.3 2022    HGB 9.6 (L) 2022    HCT 29.6 (L) 2022    MCV 89.2 2022     (L) 2022       Assessment:     Status post  section. Doing well postoperatively. Postoperative course complicated by anemia     Plan:     Continue current care.     Electronically signed by Kee Hooper MD on 2022 at 8:22 AM

## 2022-02-20 VITALS
OXYGEN SATURATION: 98 % | BODY MASS INDEX: 24.88 KG/M2 | HEIGHT: 69 IN | HEART RATE: 64 BPM | TEMPERATURE: 97.2 F | RESPIRATION RATE: 16 BRPM | DIASTOLIC BLOOD PRESSURE: 73 MMHG | SYSTOLIC BLOOD PRESSURE: 114 MMHG | WEIGHT: 168 LBS

## 2022-02-20 PROCEDURE — 6370000000 HC RX 637 (ALT 250 FOR IP): Performed by: OBSTETRICS & GYNECOLOGY

## 2022-02-20 PROCEDURE — 90471 IMMUNIZATION ADMIN: CPT | Performed by: OBSTETRICS & GYNECOLOGY

## 2022-02-20 PROCEDURE — 6360000002 HC RX W HCPCS: Performed by: OBSTETRICS & GYNECOLOGY

## 2022-02-20 PROCEDURE — 90707 MMR VACCINE SC: CPT | Performed by: OBSTETRICS & GYNECOLOGY

## 2022-02-20 RX ORDER — FERROUS SULFATE 325(65) MG
325 TABLET ORAL
Qty: 30 TABLET | Refills: 1 | Status: SHIPPED | OUTPATIENT
Start: 2022-02-20

## 2022-02-20 RX ORDER — MORPHINE SULFATE 0.5 MG/ML
INJECTION, SOLUTION EPIDURAL; INTRATHECAL; INTRAVENOUS PRN
Status: DISCONTINUED | OUTPATIENT
Start: 2022-02-18 | End: 2022-02-20 | Stop reason: SDUPTHER

## 2022-02-20 RX ADMIN — FERROUS SULFATE TAB 325 MG (65 MG ELEMENTAL FE) 325 MG: 325 (65 FE) TAB at 09:27

## 2022-02-20 RX ADMIN — ENOXAPARIN SODIUM 40 MG: 100 INJECTION SUBCUTANEOUS at 09:27

## 2022-02-20 RX ADMIN — IBUPROFEN 800 MG: 800 TABLET, FILM COATED ORAL at 09:27

## 2022-02-20 RX ADMIN — MEASLES, MUMPS, AND RUBELLA VIRUS VACCINE LIVE 0.5 ML: 1000; 12500; 1000 INJECTION, POWDER, LYOPHILIZED, FOR SUSPENSION SUBCUTANEOUS at 10:13

## 2022-02-20 RX ADMIN — OXYCODONE AND ACETAMINOPHEN 1 TABLET: 5; 325 TABLET ORAL at 05:19

## 2022-02-20 RX ADMIN — OXYCODONE AND ACETAMINOPHEN 1 TABLET: 5; 325 TABLET ORAL at 12:06

## 2022-02-20 RX ADMIN — DOCUSATE SODIUM 100 MG: 100 CAPSULE, LIQUID FILLED ORAL at 09:27

## 2022-02-20 ASSESSMENT — PAIN SCALES - GENERAL
PAINLEVEL_OUTOF10: 5
PAINLEVEL_OUTOF10: 3
PAINLEVEL_OUTOF10: 5

## 2022-02-20 NOTE — PLAN OF CARE
Problem: Discharge Planning:  Goal: Discharged to appropriate level of care  Description: Discharged to appropriate level of care  Outcome: Completed     Problem: Fluid Volume - Imbalance:  Goal: Absence of postpartum hemorrhage signs and symptoms  Description: Absence of postpartum hemorrhage signs and symptoms  2/20/2022 1021 by Ty Watson. Madeleine Garvin RN  Outcome: Completed  2/20/2022 0040 by Nani Covarrubias RN  Outcome: Met This Shift  Goal: Absence of imbalanced fluid volume signs and symptoms  Description: Absence of imbalanced fluid volume signs and symptoms  2/20/2022 0040 by Nani Covarrubias RN  Outcome: Completed     Problem: Infection - Surgical Site:  Goal: Will show no infection signs and symptoms  Description: Will show no infection signs and symptoms  2/20/2022 1021 by Ty Watson. Madeleine Garvin RN  Outcome: Completed  2/20/2022 0040 by Nani Covarrubias RN  Outcome: Met This Shift     Problem: Nausea/Vomiting:  Goal: Absence of nausea/vomiting  Description: Absence of nausea/vomiting  2/20/2022 0040 by Nani Covarurbias RN  Outcome: Completed     Problem: Pain - Acute:  Goal: Pain level will decrease  Description: Pain level will decrease  2/20/2022 1021 by Ty Watson. Madeleine Garvin RN  Outcome: Completed  2/20/2022 0040 by Nani Covarrubias RN  Outcome: Met This Shift     Problem: Urinary Retention:  Goal: Urinary elimination within specified parameters  Description: Urinary elimination within specified parameters  2/20/2022 0040 by Nani Covarrubias RN  Outcome: Completed     Problem: Venous Thromboembolism:  Goal: Absence of signs or symptoms of impaired coagulation  Description: Absence of signs or symptoms of impaired coagulation  2/20/2022 1021 by Ty Watson. Madeleine Garvin RN  Outcome: Completed  2/20/2022 0040 by Nani Covarrubias RN  Outcome: Met This Shift     Problem: Pain:  Goal: Pain level will decrease  Description: Pain level will decrease  2/20/2022 1021 by Ty Watson.  Madeleine Garvin RN  Outcome: Completed  2/20/2022 0040 by Nani Covarrubias RN  Outcome: Met This Shift  Goal: Control of acute pain  Description: Control of acute pain  2/20/2022 1021 by Ty Watson.  Madeleine Garvin RN  Outcome: Completed  2/20/2022 0040 by Nani Covarrubias RN  Outcome: Met This Shift  Goal: Control of chronic pain  Description: Control of chronic pain  Outcome: Completed

## 2022-02-20 NOTE — DISCHARGE SUMMARY
Obstetrical Discharge Form    Main OB/GYN Provider   [x]  [facility] [address]   []  [address]  []  [facility]      Gestational Age:  39w2d    Antepartum complications: None, chronic anemia    Date of Delivery:   22      Type of Delivery:    for repeat with BPS    Delivered By:     []  Turner Dailey MD   []  Amberly Acevedo MD   [x]  Maegan Bradford MD   []  Tamara Ramso MD   []  Juan Carlos Blackwood MD        Baby:       Information for the patient's :  Lisa Johnston [[id number]]          Anesthesia:    Epidural    Intrapartum complications: None    Feeding method:   Breast    Postpartum complications: None    Discharge Date:   22    Discharge Condition:           Good    Plan:   Follow up    6 Weeks Postpartum      Electronically signed by Patel Howard MD on 2022 at 8:54 AM

## 2022-02-20 NOTE — PLAN OF CARE
Problem: Discharge Planning:  Goal: Discharged to appropriate level of care  Description: Discharged to appropriate level of care  Outcome: Completed     Problem: Fluid Volume - Imbalance:  Goal: Absence of postpartum hemorrhage signs and symptoms  Description: Absence of postpartum hemorrhage signs and symptoms  2/20/2022 1021 by Belkis Graff. Little Martell RN  Outcome: Completed  2/20/2022 0040 by Blair Win RN  Outcome: Met This Shift  Goal: Absence of imbalanced fluid volume signs and symptoms  Description: Absence of imbalanced fluid volume signs and symptoms  2/20/2022 0040 by Blair Win RN  Outcome: Completed     Problem: Infection - Surgical Site:  Goal: Will show no infection signs and symptoms  Description: Will show no infection signs and symptoms  2/20/2022 1021 by Belkis Graff. Little Martell RN  Outcome: Completed  2/20/2022 0040 by Blair Win RN  Outcome: Met This Shift     Problem: Nausea/Vomiting:  Goal: Absence of nausea/vomiting  Description: Absence of nausea/vomiting  2/20/2022 0040 by Blair Win RN  Outcome: Completed     Problem: Pain - Acute:  Goal: Pain level will decrease  Description: Pain level will decrease  2/20/2022 1021 by Belkis Graff. Little Martell RN  Outcome: Completed  2/20/2022 0040 by Blair Win RN  Outcome: Met This Shift     Problem: Urinary Retention:  Goal: Urinary elimination within specified parameters  Description: Urinary elimination within specified parameters  2/20/2022 0040 by Blair Win RN  Outcome: Completed     Problem: Venous Thromboembolism:  Goal: Absence of signs or symptoms of impaired coagulation  Description: Absence of signs or symptoms of impaired coagulation  2/20/2022 1021 by Belkis Graff. Little Martell RN  Outcome: Completed  2/20/2022 0040 by Blair Win RN  Outcome: Met This Shift     Problem: Pain:  Goal: Pain level will decrease  Description: Pain level will decrease  2/20/2022 1021 by Belkis Graff.  Little Martell RN  Outcome: Completed  2/20/2022 0040 by Blair Win RN  Outcome: Met This Shift  Goal: Control of acute pain  Description: Control of acute pain  2/20/2022 1021 by Becca Armendariz.  Joe Boateng RN  Outcome: Completed  2/20/2022 0040 by Lj العراقي RN  Outcome: Met This Shift  Goal: Control of chronic pain  Description: Control of chronic pain  Outcome: Completed

## 2022-02-20 NOTE — FLOWSHEET NOTE
Discharge instructions given and reviewed, pt verbalizes understanding. Signed Prescriptions given. Pt verbalizes understanding to make and keep appointment with her OB doctor for her to be seen in 6 weeks. Instructed pt that ID bands need to remain on and once home to properly dispose of them in order to protect their identity. Pt verbalizes understanding. Pt states that the father of the baby will be taking her and baby home.  See After Visit Summary (Discharge Instructions.)

## 2022-02-20 NOTE — FLOWSHEET NOTE
Pt awake and resting in bed. Reminded pt if she needs nurse to call number listed on white board. Pt verbalizes understanding. Infant in room with pt. Assessment completed.

## 2022-02-20 NOTE — ADDENDUM NOTE
Addendum  created 02/20/22 0815 by LATIA Britton CRNA    Intraprocedure Meds edited, Orders acknowledged in Narrator

## 2022-02-20 NOTE — FLOWSHEET NOTE
Pt discharged at this time via wheel chair. Condition stable. Pt verbalizes understanding to make appointment and to keep appointment with [facility] doctor for her to be seen in 6 weeks. Discharged with infant in car seat and father of the baby.